# Patient Record
Sex: MALE | Race: WHITE | Employment: FULL TIME | URBAN - METROPOLITAN AREA
[De-identification: names, ages, dates, MRNs, and addresses within clinical notes are randomized per-mention and may not be internally consistent; named-entity substitution may affect disease eponyms.]

---

## 2024-03-23 ENCOUNTER — OFFICE VISIT (OUTPATIENT)
Age: 24
End: 2024-03-23

## 2024-03-23 VITALS
BODY MASS INDEX: 22.98 KG/M2 | WEIGHT: 143 LBS | DIASTOLIC BLOOD PRESSURE: 76 MMHG | HEIGHT: 66 IN | HEART RATE: 75 BPM | OXYGEN SATURATION: 98 % | SYSTOLIC BLOOD PRESSURE: 119 MMHG | TEMPERATURE: 98.2 F

## 2024-03-23 DIAGNOSIS — H00.14 CHALAZION OF LEFT UPPER EYELID: Primary | ICD-10-CM

## 2024-03-23 RX ORDER — ERYTHROMYCIN 5 MG/G
OINTMENT OPHTHALMIC EVERY 6 HOURS
Qty: 1 G | Refills: 0 | Status: SHIPPED | OUTPATIENT
Start: 2024-03-23

## 2024-03-23 ASSESSMENT — ENCOUNTER SYMPTOMS
EYE DISCHARGE: 0
EYE REDNESS: 1
EYE PAIN: 1
EYE ITCHING: 0
PHOTOPHOBIA: 0

## 2024-03-23 ASSESSMENT — VISUAL ACUITY: OU: 1

## 2024-03-23 NOTE — PATIENT INSTRUCTIONS
Please follow up with your PCP in 2-5 days.    Go to the ER for worsening or persistent symptoms, changes in vision, severe headache with nausea or pain with eye movement.    Wet a clean washcloth with warm water and place it on your eye for 10-15 minutes, 3 to 4 times each day, or as directed.  Keep your hands away from your eye.  This helps to prevent the spread of infection to the other parts of the eye.  Wash your hands often with soap and dry with a clean towel.  Do not squeeze the stye.

## 2024-03-23 NOTE — PROGRESS NOTES
Subjective     Chief Complaint   Patient presents with    Foreign Body in Eye    Conjunctivitis     Sx started 3 days ago. Used OTC drops. Thought it was sinus pressure at first. Slightly red, painful to the touch, drainage.        Patient ID:  Kristofer Bojorquez is a 24 y.o. male.    Patient is 24 year old male presenting with left eye pain and swelling.  Symptoms began three days ago.  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  He has been using OTC drops with no relief.      Foreign Body in Eye    Conjunctivitis   Associated symptoms include eye pain and eye redness. Pertinent negatives include no eye itching, no photophobia and no eye discharge.       Review of Systems   Eyes:  Positive for pain and redness. Negative for photophobia, discharge, itching and visual disturbance.       History reviewed. No pertinent past medical history.    History reviewed. No pertinent surgical history.    History reviewed. No pertinent family history.    No Known Allergies    Social History     Tobacco Use    Smoking status: Never    Smokeless tobacco: Never       Objective   Vitals:    03/23/24 1115   BP: 119/76   Pulse: 75   Temp: 98.2 °F (36.8 °C)   SpO2: 98%     Physical Exam  Constitutional:       General: He is not in acute distress.     Appearance: Normal appearance. He is not ill-appearing.   HENT:      Head: Normocephalic and atraumatic.   Eyes:      General: Lids are everted, no foreign bodies appreciated. Vision grossly intact.      Extraocular Movements: Extraocular movements intact.      Right eye: Normal extraocular motion.      Left eye: Normal extraocular motion.      Conjunctiva/sclera: Conjunctivae normal.      Right eye: Right conjunctiva is not injected. No exudate.     Left eye: Left conjunctiva is not injected. No exudate.     Pupils: Pupils are equal, round, and reactive to light.     Cardiovascular:      Rate and Rhythm: Normal rate.      Pulses: Normal pulses.   Pulmonary: